# Patient Record
Sex: FEMALE | Employment: UNEMPLOYED | URBAN - METROPOLITAN AREA
[De-identification: names, ages, dates, MRNs, and addresses within clinical notes are randomized per-mention and may not be internally consistent; named-entity substitution may affect disease eponyms.]

---

## 2017-01-09 ENCOUNTER — MEDICAL CORRESPONDENCE (OUTPATIENT)
Dept: HEALTH INFORMATION MANAGEMENT | Facility: CLINIC | Age: 47
End: 2017-01-09

## 2017-01-09 ENCOUNTER — TRANSFERRED RECORDS (OUTPATIENT)
Dept: HEALTH INFORMATION MANAGEMENT | Facility: CLINIC | Age: 47
End: 2017-01-09

## 2017-01-12 ENCOUNTER — TRANSFERRED RECORDS (OUTPATIENT)
Dept: HEALTH INFORMATION MANAGEMENT | Facility: CLINIC | Age: 47
End: 2017-01-12

## 2017-01-12 DIAGNOSIS — H53.10 SUBJECTIVE VISUAL DISTURBANCE: Primary | ICD-10-CM

## 2017-01-13 ENCOUNTER — OFFICE VISIT (OUTPATIENT)
Dept: OPHTHALMOLOGY | Facility: CLINIC | Age: 47
End: 2017-01-13
Attending: OPHTHALMOLOGY
Payer: COMMERCIAL

## 2017-01-13 DIAGNOSIS — H50.22 HYPERTROPIA OF LEFT EYE: ICD-10-CM

## 2017-01-13 DIAGNOSIS — H35.373 EPIRETINAL MEMBRANE, BILATERAL: ICD-10-CM

## 2017-01-13 DIAGNOSIS — H53.10 SUBJECTIVE VISUAL DISTURBANCE: Primary | ICD-10-CM

## 2017-01-13 DIAGNOSIS — H57.11 PAIN IN OR AROUND EYE, RIGHT: ICD-10-CM

## 2017-01-13 DIAGNOSIS — H53.10 SUBJECTIVE VISUAL DISTURBANCE: ICD-10-CM

## 2017-01-13 LAB — TSH SERPL DL<=0.05 MIU/L-ACNC: 0.2 MU/L (ref 0.4–4)

## 2017-01-13 PROCEDURE — 99215 OFFICE O/P EST HI 40 MIN: CPT | Mod: ZF

## 2017-01-13 PROCEDURE — 92083 EXTENDED VISUAL FIELD XM: CPT | Mod: ZF | Performed by: OPHTHALMOLOGY

## 2017-01-13 PROCEDURE — 92060 SENSORIMOTOR EXAMINATION: CPT | Mod: ZF | Performed by: OPHTHALMOLOGY

## 2017-01-13 RX ORDER — ACETAMINOPHEN 160 MG
4000 TABLET,DISINTEGRATING ORAL
COMMUNITY
Start: 2016-11-29 | End: 2018-09-17

## 2017-01-13 RX ORDER — ONDANSETRON 8 MG/1
TABLET, ORALLY DISINTEGRATING ORAL
COMMUNITY
Start: 2016-12-08

## 2017-01-13 RX ORDER — PREDNISONE 1 MG/1
1 TABLET ORAL
COMMUNITY
Start: 2016-12-27 | End: 2018-09-17

## 2017-01-13 RX ORDER — PREDNISONE 5 MG/1
5 TABLET ORAL
COMMUNITY
Start: 2016-12-27 | End: 2018-09-17

## 2017-01-13 RX ORDER — LANOLIN ALCOHOL/MO/W.PET/CERES
800 CREAM (GRAM) TOPICAL
COMMUNITY
Start: 2016-12-27 | End: 2018-09-17

## 2017-01-13 RX ORDER — POLYETHYLENE GLYCOL 3350 17 G/17G
17 POWDER, FOR SOLUTION ORAL
COMMUNITY
Start: 2016-12-27

## 2017-01-13 RX ORDER — PREDNISOLONE ACETATE 10 MG/ML
1 SUSPENSION/ DROPS OPHTHALMIC 4 TIMES DAILY
Qty: 1 BOTTLE | Refills: 0 | Status: SHIPPED | OUTPATIENT
Start: 2017-01-13 | End: 2017-01-25

## 2017-01-13 RX ORDER — PREDNISONE 20 MG/1
40 TABLET ORAL
COMMUNITY
Start: 2016-12-27 | End: 2018-09-17

## 2017-01-13 ASSESSMENT — REFRACTION_WEARINGRX
OD_CYLINDER: +0.25
OD_AXIS: 171
OD_SPHERE: -1.25
OS_SPHERE: -1.00

## 2017-01-13 ASSESSMENT — VISUAL ACUITY
CORRECTION_TYPE: GLASSES
OD_CC+: +2
OS_CC+: +2
OD_CC: 20/25
METHOD: SNELLEN - LINEAR
OS_CC: 20/25

## 2017-01-13 ASSESSMENT — SLIT LAMP EXAM - LIDS
COMMENTS: NORMAL
COMMENTS: NORMAL

## 2017-01-13 ASSESSMENT — TONOMETRY
OS_IOP_MMHG: 13
IOP_METHOD: TONOPEN
OD_IOP_MMHG: 18

## 2017-01-13 ASSESSMENT — CONF VISUAL FIELD
OD_NORMAL: 1
OS_NORMAL: 1

## 2017-01-13 NOTE — NURSING NOTE
Chief Complaints and History of Present Illnesses   Patient presents with     Neurologic Problem     distorted vision     HPI    Affected eye(s):  Both   Symptoms:     Blurred vision   Distorted vision      Frequency:  Intermittent       Do you have eye pain now?:  Yes   Location:  OU, OD   Pain Level:  Extreme Pain (8)   Pain Frequency:  Daily, Intermittent   Pain Characteristics:  Stabbing      Comments:  Amelia is a 47 year old female presenting with the following symptoms:    1. Distorted vision       - typically distorsion presents as blurred vision, but last week, floor looks to have a permanent slant to it.        - intermittent diplopia. Onset 04/2016.        - 57 mg prednisone daily for swelling. Trouble when tapering.       -       2. Eye pain       - worse on EOM. Right pain > Left       - Samantha Day: describes pain, swelling and chemosis of right eye       - thyroid testing was negative.     3. Photosensitivity      - wears dark glasses at night to drive    4. Headaches/head pain.  - associated with gluten sensitivity  - in the back and top of head.   - relieved by Tizanidine  - wake her up at night.     5. Tinnitus  - high pitch ringing both ears.   - feels ears are always clogged.     Jack Robie CO 7:31 AM January 13, 2017       5. Eye swelling    History of Left optic nerve swelling in 2013 (MRI at [facility])  10/2015: pain in right eye and use of prednisone. Prednisone helped with the pain. But pain returned when discontinued

## 2017-01-13 NOTE — Clinical Note
2017         RE:  :  MRN: Amelia Lin  1970  [id number]     Dear Dr. Tang,    Thank you for asking me to see your very pleasant patient, Amelia Lin, in neuro-ophthalmic consultation.  I would like to thank you for sending your records and I have summarized them in the history of present illness.  My assessment and plan are below.  For further details, please see my attached clinic note.        Assessment & Plan     Amelia Lin is a 47 year old female with the following diagnoses:   1. Subjective visual disturbance    2. Hypertropia of left eye    3. Epiretinal membrane, bilateral    4. Pain in or around eye, right       She has bilateral  Eye pain for several months. This improved with steroid antibiotic eye drops and also prednisone 60 mg daily.  She was treated for idiopathic orbital inflammatory syndrome however her eye pain returns when she drops to 57 mg prednisone.  If she reduces prednisone, then her eyelid swells and her eye hurts.  In fact, it is hurting now. A drop of proparacaine helps the pain significantly.  Her labs show a very low thryoid stimulating hormone.     I looked at her MRI personally.  There is increased hyperintensity on the scan before and after prednisone treatment.  It is worse in the axial plane and not really there in the coronal plane.  She has metal artifact in her teeth.  Her lacrimal glands look funny to me and I will confirm with oculoplastics.      I will obtain a CT orbit.  This will allow me to assess any bony erosion from the lacrimal glands and also see if there is truly dirty fat in the orbits.  I would favor that this is not idiopathic orbital inflammatory syndrome.      It sounds more like a steroid responsive anterior surface given the response to topical steroids and proparacaine.  Will taper prednisone more aggressively and put her on prednisolone acetate 1% ophthalmic suspension.  Will repeat thyroid stimulating immunoglobulin and thryoid  stimulating hormone.      She is concerned that her body swells up when she comes down on prednisone.  If this is the case then I will ask Dr. Andino for a steroid sparing agent.      Follow up 1 month.          Again, thank you for allowing me to participate in the care of your patient.      Sincerely,    Sung Read MD  Professor, Neuro-Ophthalmology  Department of Ophthalmology and Visual Neurosciences  [facility]    CC: Favian Tang MD  [facility]  [address]  [facility] [address]  VIA Facsimile: [phone]     Williams Ulloa MD   Physicians  [address]  [facility] [address]  VIA In Basket     Kaitlynn Gaming MD  [facility]  [address]  [facility] [address]  VIA Facsimile:  [phone]     Benoit Mendez MD  [facility]  [address]  [facility] [address]  VIA Facsimile: [phone]

## 2017-01-13 NOTE — PROGRESS NOTES
Assessment & Plan     Amelia Lin is a 47 year old female with the following diagnoses:   1. Subjective visual disturbance    2. Hypertropia of left eye    3. Epiretinal membrane, bilateral    4. Pain in or around eye, right       She has bilateral  Eye pain for several months. This improved with steroid antibiotic eye drops and also prednisone 60 mg daily.  She was treated for idiopathic orbital inflammatory syndrome however her eye pain returns when she drops to 57 mg prednisone.  If she reduces prednisone, then her eyelid swells and her eye hurts.  In fact, it is hurting now. A drop of proparacaine helps the pain significantly.  Her labs show a very low thryoid stimulating hormone.     I looked at her MRI personally.  There is increased hyperintensity on the scan before and after prednisone treatment.  It is worse in the axial plane and not really there in the coronal plane.  She has metal artifact in her teeth.  Her lacrimal glands look funny to me and I will confirm with oculoplastics.      I will obtain a CT orbit.  This will allow me to assess any bony erosion from the lacrimal glands and also see if there is truly dirty fat in the orbits.  I would favor that this is not idiopathic orbital inflammatory syndrome.      It sounds more like a steroid responsive anterior surface given the response to topical steroids and proparacaine.  Will taper prednisone more aggressively and put her on prednisolone acetate 1% ophthalmic suspension.  Will repeat thyroid stimulating immunoglobulin and thryoid stimulating hormone.      She is concerned that her body swells up when she comes down on prednisone.  If this is the case then I will ask Dr. Andino for a steroid sparing agent.      Follow up 1 month.             Attending Physician Attestation:  I have seen and examined this patient.  I have confirmed and edited as necessary the chief complaint(s), history of present illness, review of systems, relevant history,  and examination findings as documented by others.  I have personally reviewed the relevant tests, images, and reports as documented above.  I have confirmed and edited as necessary the assessment and plan and agree with this note.  - Sung Read MD 9:10 AM 1/13/2017

## 2017-01-13 NOTE — MR AVS SNAPSHOT
After Visit Summary   1/13/2017    Amelia Lin    MRN: [id number]           Patient Information     Date Of Birth          1970        Visit Information        Provider Department      1/13/2017 7:30 AM Sung Read MD [facility]        Today's Diagnoses     Subjective visual disturbance    -  1     Hypertropia of left eye         Epiretinal membrane, bilateral         Pain in or around eye, right            Follow-ups after your visit        Follow-up notes from your care team     Return in 4 weeks (on 2/10/2017).      Your next 10 appointments already scheduled     Jan 13, 2017  1:40 PM   (Arrive by 1:25 PM)   CT TEMPORAL ORBITAL SELLA W/O & W CONTRAST with UCCT1   [facility] ([facility])    [address]  1st Floor  [place] [address] [phone]           Please bring any scans or X-rays taken at other hospitals, if similar tests were done. Also bring a list of your medicines, including vitamins, minerals and over-the-counter drugs. It is safest to leave personal items at home.  Be sure to tell your doctor:   If you have any allergies.   If there s any chance you are pregnant.   If you are breastfeeding.   If you have any special needs.  You will have contrast for this exam. To prepare:   Do not eat or drink for 2 hours before your exam. If you need to take medicine, you may take it with small sips of water. (We may ask you to take liquid medicine as well.)   The day before your exam, drink extra fluids at least six 8-ounce glasses (unless your doctor tells you to restrict your fluids).  Patients over 70 or patients with diabetes or kidney problems:   If you haven t had a blood test (creatinine test) within the last 30 days, go to your clinic or Diagnostic Imaging Department for this test.  If you have diabetes:   If your kidney function is normal, continue taking your metformin (Avandamet, Glucophage, Glucovance, Metaglip)  on the day of your exam.   If your kidney function is abnormal, wait 48 hours before restarting this medicine.  Please wear loose clothing, such as a sweat suit or jogging clothes. Avoid snaps, zippers and other metal. We may ask you to undress and put on a hospital gown.  If you have any questions, please call the Imaging Department where you will have your exam.              Future tests that were ordered for you today     Open Future Orders        Priority Expected Expires Ordered    CT Temporal Orbital Sella w/o & w Cont Routine  1/13/2018 1/13/2017    Thyroid stimulating immunoglobulin Routine  4/13/2017 1/13/2017    TSH Routine  4/13/2017 1/13/2017    IOP Measurement Routine  7/17/2018 1/13/2017    Color Vision - Screening OU (both eyes) Routine  7/17/2018 1/13/2017    OCT Optic Nerve RNFL Spectralis OU (both eyes) Routine  7/17/2018 1/13/2017            Who to contact     Please call your clinic at [phone] to:    Ask questions about your health    Make or cancel appointments    Discuss your medicines    Learn about your test results    Speak to your doctor   If you have compliments or concerns about an experience at your clinic, or if you wish to file a complaint, please contact [facility] Patient Relations at [phone] or email us at [email or web address]         Additional Information About Your Visit        MyChart Information     [employer] gives you secure access to your electronic health record. If you see a primary care provider, you can also send messages to your care team and make appointments. If you have questions, please call your primary care clinic.  If you do not have a primary care provider, please call [phone] and they will assist you.      [employer] is an electronic gateway that provides easy, online access to your medical records. With [employer], you can request a clinic appointment, read your test results, renew a prescription or communicate with  your care team.     To access your existing account, please contact your [facility] or call [phone] for assistance.        Care EveryWhere ID     This is your Care EveryWhere ID. This could be used by other organizations to access your [facility] medical records  [id number]         Blood Pressure from Last 3 Encounters:   No data found for BP    Weight from Last 3 Encounters:   05/26/15 119.75 kg (264 lb)              We Performed the Following     Color Vision - Screening OU (both eyes)     Glaucoma Top OU     IOP Measurement     Sensorimotor          Today's Medication Changes          These changes are accurate as of: 1/13/17  9:34 AM.  If you have any questions, ask your nurse or doctor.               Start taking these medicines.        Dose/Directions    prednisoLONE acetate 1 % ophthalmic susp   Commonly known as:  PRED FORTE   Used for:  Subjective visual disturbance, Hypertropia of left eye, Epiretinal membrane, bilateral, Pain in or around eye, right   Started by:  Sung Read MD        Dose:  1 drop   Place 1 drop into both eyes 4 times daily   Quantity:  1 Bottle   Refills:  0            Where to get your medicines      These medications [facility] sent to [id number] - [address] AT CORNER OF [address] [place] [address]     Phone:  [phone]    - prednisoLONE acetate 1 % ophthalmic susp             Primary Care Provider Office Phone # Fax #    Favian Tang [phone]       [facility] [address] [address]        Thank you!     Thank you for choosing [facility]  for your care. Our goal is always to provide you with excellent care. Hearing back from our patients is one way we can continue to improve our services. Please take a few minutes to complete the written survey that you may receive in the mail after your visit with us. Thank you!              Your Updated Medication List - Protect others around you: Learn how to safely use, store and throw away your medicines at www.disposemymeds.org.          This list is accurate as of: 1/13/17  9:34 AM.  Always use your most recent med list.                   Brand Name Dispense Instructions for use    ARTIFICIAL TEARS Oint      1 Application       Fish Oil 1200 MG Cpdr      Take 1,200 mg by mouth       GENTEAL MILD TO MODERATE 0.3 % Soln ophthalmic solution   Generic drug:  hypromellose          HYDROCHLOROTHIAZIDE PO      Take 25 mg by mouth daily       magnesium oxide 400 (240 MG) MG tablet    MAG-OX     Take 800 mg by mouth       NAPROXEN SODIUM PO      Take 550 mg by mouth 2 times daily (with meals)       OMEPRAZOLE PO      Take 20 mg by mouth 2 times daily (before meals)       ondansetron 8 MG ODT tab    ZOFRAN-ODT         oxyCODONE-acetaminophen 5-325 MG per tablet    PERCOCET     Take 1-2 tablets by mouth every 4 hours as needed for moderate to severe pain       polyethylene glycol powder    MIRALAX/GLYCOLAX     Take 17 g by mouth       prednisoLONE acetate 1 % ophthalmic susp    PRED FORTE    1 Bottle    Place 1 drop into both eyes 4 times daily       * predniSONE 1 MG tablet    DELTASONE     Take 1 mg by mouth       * predniSONE 20 MG tablet    DELTASONE     Take 40 mg by mouth       * predniSONE 5 MG tablet    DELTASONE     Take 5 mg by mouth       TIZANIDINE HCL PO      Take 4 mg by mouth 2 times daily as needed for muscle spasms       vitamin D3 2000 UNITS Caps      Take 4,000 Units by mouth       * Notice:  This list has 3 medication(s) that are the same as other medications prescribed for you. Read the directions carefully, and ask your doctor or other care provider to review them with you.

## 2017-01-16 DIAGNOSIS — R79.89 LOW TSH LEVEL: Primary | ICD-10-CM

## 2017-01-17 ENCOUNTER — TELEPHONE (OUTPATIENT)
Dept: OPHTHALMOLOGY | Facility: CLINIC | Age: 47
End: 2017-01-17

## 2017-01-17 LAB — TSI SER-ACNC: NORMAL

## 2017-01-17 NOTE — TELEPHONE ENCOUNTER
Pt last seen by dr. Read 1-13-17.  Has had CT done since visit.  Pt now on 50 mg oral prednisone and having worsening symptoms  Prednisolone drops help with symptoms, but having swelling of uppder/lower lids  Sharp headache pain  Tearing   White part of eyes puffy  Pressure behind eyes    States happens when prednisone reduced  Offered appt today-- pt unable needs day for transportation and has to work-- new to this job and needs to be there.  Pt taking picture to send to MD to eval    Note to facilitator/dr. Read for review and f/u plan of care  Fabian Duvall RN 11:41 AM 01/17/2017

## 2017-01-17 NOTE — TELEPHONE ENCOUNTER
----- Message from Gustavo Carlson sent at 1/17/2017 11:02 AM CST -----  Regarding: SYMPTOMATIC Pt - 4+ days  Contact: [phone]  Amelia Baird is still having eye pain from her recent visit with Dr. Read and would like a call back to discuss treatment.      Thanks,  Gustavo    Please DO NOT send this message and/or reply back to sender.  Call Center Representatives DO NOT respond to messages.

## 2017-01-18 ENCOUNTER — TELEPHONE (OUTPATIENT)
Dept: OPHTHALMOLOGY | Facility: CLINIC | Age: 47
End: 2017-01-18

## 2017-01-18 DIAGNOSIS — R79.89 LOW TSH LEVEL: Primary | ICD-10-CM

## 2017-01-19 ENCOUNTER — OFFICE VISIT (OUTPATIENT)
Dept: OPHTHALMOLOGY | Facility: CLINIC | Age: 47
End: 2017-01-19
Attending: OPHTHALMOLOGY
Payer: COMMERCIAL

## 2017-01-19 DIAGNOSIS — R51.9 HEADACHE AROUND THE EYES: Primary | ICD-10-CM

## 2017-01-19 DIAGNOSIS — H53.10 SUBJECTIVE VISUAL DISTURBANCE: Primary | ICD-10-CM

## 2017-01-19 PROCEDURE — 99211 OFF/OP EST MAY X REQ PHY/QHP: CPT | Mod: ZF

## 2017-01-19 PROCEDURE — 99212 OFFICE O/P EST SF 10 MIN: CPT

## 2017-01-19 ASSESSMENT — VISUAL ACUITY
OS_CC: 20/30
OS_CC+: +1
OD_CC: 20/30
METHOD: SNELLEN - LINEAR
OD_CC+: +1

## 2017-01-19 ASSESSMENT — TONOMETRY
IOP_METHOD: TONOPEN
OD_IOP_MMHG: 18
IOP_METHOD: TONOPEN
OS_IOP_MMHG: 23
OS_IOP_MMHG: 23
OD_IOP_MMHG: 28

## 2017-01-19 ASSESSMENT — EXTERNAL EXAM - LEFT EYE: OS_EXAM: NORMAL

## 2017-01-19 ASSESSMENT — EXTERNAL EXAM - RIGHT EYE: OD_EXAM: NORMAL

## 2017-01-19 ASSESSMENT — SLIT LAMP EXAM - LIDS
COMMENTS: NORMAL
COMMENTS: NORMAL

## 2017-01-19 NOTE — PROGRESS NOTES
Assessment & Plan     Amelia Lin is a 47 year old female with the following diagnoses:   1. Headache around the eyes         Over the past two days she had recurrence of severe headache around her eyes with more light sensitivity and pain around her eye. Examination today is stable with no changes since the last visit.      Would continue prednisone taper as previously described.  Will get consult for headache specialist.  Her thryoid stimulating hormone is very low and will have her see endocrine.           Attending Physician Attestation:  I have seen and examined this patient.  I have confirmed and edited as necessary the chief complaint(s), history of present illness, review of systems, relevant history, and examination findings as documented by others.  I have personally reviewed the relevant tests, images, and reports as documented above.  I have confirmed and edited as necessary the assessment and plan and agree with this note.  - Sung Read MD 3:36 PM 1/19/2017

## 2017-01-19 NOTE — TELEPHONE ENCOUNTER
"Patient called stating bilateral eye pain worse since decreasing oral prednisone. Wonders if it is an infection and if she shouldn't be treated for infection. States eyes are \"dripping a lot, like Won Ding in that movie\". She is having trouble performing her work duties due to discomfort. Pain is becoming more constant. Discussed increased use of artificial tears, cool compresses. Patient wanting to know if okay to go back up to 60 mg of prednisone as she does much better at that dose. I instructed her to take an additional 10 mg on top of the 50 mg she already took today to see if that helps. Patient frustrated that she has been calling in the past 2 days and has not received a call back from Dr. Read. I affirmed that I will send a message to Dr. Read stating that the patient would really appreciate a call back.     Sven Fabian MD MPH   Ophthalmology Resident PGY-3      "

## 2017-01-19 NOTE — Clinical Note
2017         RE:  :  MRN: Amelia Lin  1970  [id number]     Dear Dr. Tang,     Your patient, Amelia Lin, returned for neuro-ophthalmic follow up. My assessment and plan are below.  For further details, please see my attached clinic note.      Assessment & Plan     Amelia Lin is a 47 year old female with the following diagnoses:   1. Headache around the eyes         Over the past two days she had recurrence of severe headache around her eyes with more light sensitivity and pain around her eye. Examination today is stable with no changes since the last visit.      Would continue prednisone taper as previously described.  Will get consult for headache specialist.  Her thryoid stimulating hormone is very low and will have her see endocrine.        Again, thank you for allowing me to participate in the care of your patient.      Sincerely,    Sung Read MD  Professor, Neuro-Ophthalmology  Department of Ophthalmology and Visual Neurosciences  [facility]    CC: Favian Tang MD  [facility]  [address] [address]  VIA Facsimile: [phone]     Williams Ulloa MD  VIA In Basket     Jing Valentino MD  VIA In Basket

## 2017-01-25 DIAGNOSIS — H50.22 HYPERTROPIA OF LEFT EYE: ICD-10-CM

## 2017-01-25 DIAGNOSIS — H35.373 EPIRETINAL MEMBRANE, BILATERAL: ICD-10-CM

## 2017-01-25 DIAGNOSIS — H57.11 PAIN IN OR AROUND EYE, RIGHT: ICD-10-CM

## 2017-01-25 DIAGNOSIS — H53.10 SUBJECTIVE VISUAL DISTURBANCE: Primary | ICD-10-CM

## 2017-01-25 NOTE — TELEPHONE ENCOUNTER
prednisolone  Last Written Prescription Date:  1/13/17  Last Fill Quantity: 1 btl,   # refills: 0  Last Office Visit: 1/13/17 and 1/19/17  Future Office visit: no  Last Note:  Author Note Status Last Update User Last Update Date/Time      Sung Read MD Signed Sung Read MD 1/13/2017  2:16 PM       Progress Notes      Expand All Collapse All    Assessment & Plan      Assessment & Plan     Amelia Lin is a 47 year old female with the following diagnoses:    1.  Subjective visual disturbance     2.  Hypertropia of left eye     3.  Epiretinal membrane, bilateral     4.  Pain in or around eye, right        She has bilateral  Eye pain for several months. This improved with steroid antibiotic eye drops and also prednisone 60 mg daily.  She was treated for idiopathic orbital inflammatory syndrome however her eye pain returns when she drops to 57 mg prednisone.  If she reduces prednisone, then her eyelid swells and her eye hurts.  In fact, it is hurting now. A drop of proparacaine helps the pain significantly.  Her labs show a very low thryoid stimulating hormone.     I looked at her MRI personally.  There is increased hyperintensity on the scan before and after prednisone treatment.  It is worse in the axial plane and not really there in the coronal plane.  She has metal artifact in her teeth.  Her lacrimal glands look funny to me and I will confirm with oculoplastics.      I will obtain a CT orbit.  This will allow me to assess any bony erosion from the lacrimal glands and also see if there is truly dirty fat in the orbits.  I would favor that this is not idiopathic orbital inflammatory syndrome.      It sounds more like a steroid responsive anterior surface given the response to topical steroids and proparacaine.  Will taper prednisone more aggressively and put her on prednisolone acetate 1% ophthalmic suspension.  Will repeat thyroid stimulating immunoglobulin and thryoid stimulating hormone.       She is concerned that her body swells up when she comes down on prednisone.  If this is the case then I will ask Dr. Andino for a steroid sparing agent.      Follow up 1 month.              Routing refill request to provider for review/approval because:  Unsure if this is to continue, no follow up appt

## 2017-01-26 ENCOUNTER — TRANSFERRED RECORDS (OUTPATIENT)
Dept: HEALTH INFORMATION MANAGEMENT | Facility: CLINIC | Age: 47
End: 2017-01-26

## 2017-01-26 ENCOUNTER — TELEPHONE (OUTPATIENT)
Dept: OPHTHALMOLOGY | Facility: CLINIC | Age: 47
End: 2017-01-26

## 2017-01-26 RX ORDER — PREDNISOLONE ACETATE 10 MG/ML
1 SUSPENSION/ DROPS OPHTHALMIC 4 TIMES DAILY
Qty: 1 BOTTLE | Refills: 0 | Status: SHIPPED | OUTPATIENT
Start: 2017-01-26 | End: 2018-09-17

## 2017-01-26 NOTE — TELEPHONE ENCOUNTER
"Right eye horrible continuous pain last night, hurts to squint, hurts to move it, everything makes it hurt. Considerably worse than it used to be. This morning it started draining;   Photophobia, causing headaches and eye strain OD>OS, hard to work because of bright light and computers at work. Has to wear sunglasses over glasses.   Only way to relieve pain is to put herself in coma with \"Tizanidine\" but she can't function when she takes, goes right to sleep.   No other pain medication will work. The eye drops help with swelling but nothing will help with pain. She was going to go to ER because of the pain.   Constant double vision, right eye has a grey spot in vision, floor warped (OU).      Also, pt needs refill on eye drop; Pharmacy has not received yet.   [id number] [place], [place] - [address]    Pt. Wondering, will she be taking this eye drop indefinitely? It doesn't help with pain.   "

## 2017-01-27 ENCOUNTER — TELEPHONE (OUTPATIENT)
Dept: OPHTHALMOLOGY | Facility: CLINIC | Age: 47
End: 2017-01-27

## 2017-01-27 NOTE — TELEPHONE ENCOUNTER
----- Message from Sung Read MD sent at 1/26/2017  7:45 PM CST -----  Regarding: RE: Note from Dr. Read  Contact: [phone]  Could you please tell patient that it is unclear to me why the patient has pain?  I would opt for another opinion.   We can send her to [place] if she would like. Thanks.    mirta    ----- Message -----     From: Whit Wills     Sent: 1/26/2017  11:48 AM       To: Sung Read MD, CLINTON Salazar  Subject: RE: Note from Dr. Read                            I wrote a note, but when I spoke with her she said it wouldn't help anyway so never mind.    ----- Message -----     From: Wes Simmons COA     Sent: 1/23/2017  10:11 AM       To: Sung Read MD, Whit Wills  Subject: FW: Note from Dr. Read                            Estefani,     Will you write this please and mail to pt, if Dr. Read approves?  She will also being seeing an endocrinologist and headache specialist, if that matters.    Thanks    ----- Message -----     From: Ella Ramsey     Sent: 1/23/2017  10:04 AM       To: CLINTON Salazar  Subject: Note from Dr. Read                                Pt called to request that Dr. Read provide a note for her job. She stated that due to her worsening vision and sensitivity to light at work, that a note is required for her employer to make any adjustments as far as a new computer monitor goes. Please call pt back to discuss further. Thanks.    KP    Please DO NOT send this message and/or reply back to sender.  Call Center Representatives DO NOT respond to messages.

## 2017-01-30 DIAGNOSIS — H05.10 ORBITAL INFLAMMATION: Primary | ICD-10-CM

## 2017-01-30 RX ORDER — PREDNISONE 20 MG/1
40 TABLET ORAL DAILY
Qty: 60 TABLET | Refills: 3 | Status: SHIPPED | OUTPATIENT
Start: 2017-01-30 | End: 2018-09-17

## 2017-02-03 ENCOUNTER — MEDICAL CORRESPONDENCE (OUTPATIENT)
Dept: HEALTH INFORMATION MANAGEMENT | Facility: CLINIC | Age: 47
End: 2017-02-03

## 2017-02-28 ENCOUNTER — TRANSFERRED RECORDS (OUTPATIENT)
Dept: HEALTH INFORMATION MANAGEMENT | Facility: CLINIC | Age: 47
End: 2017-02-28

## 2017-06-10 ENCOUNTER — HEALTH MAINTENANCE LETTER (OUTPATIENT)
Age: 47
End: 2017-06-10

## 2018-03-19 ENCOUNTER — TRANSFERRED RECORDS (OUTPATIENT)
Dept: HEALTH INFORMATION MANAGEMENT | Facility: CLINIC | Age: 48
End: 2018-03-19

## 2018-09-17 ENCOUNTER — HOSPITAL ENCOUNTER (OUTPATIENT)
Dept: LAB | Facility: CLINIC | Age: 48
Discharge: HOME | End: 2018-09-17
Attending: PHYSICIAN ASSISTANT | Admitting: PHYSICIAN ASSISTANT
Payer: COMMERCIAL

## 2018-09-17 ENCOUNTER — OFFICE VISIT (OUTPATIENT)
Dept: SURGERY | Facility: CLINIC | Age: 48
End: 2018-09-17
Payer: COMMERCIAL

## 2018-09-17 VITALS
OXYGEN SATURATION: 99 % | HEART RATE: 79 BPM | DIASTOLIC BLOOD PRESSURE: 79 MMHG | WEIGHT: 293 LBS | SYSTOLIC BLOOD PRESSURE: 123 MMHG | RESPIRATION RATE: 12 BRPM | BODY MASS INDEX: 47.09 KG/M2 | HEIGHT: 66 IN

## 2018-09-17 DIAGNOSIS — Z13.0 SCREENING FOR IRON DEFICIENCY ANEMIA: ICD-10-CM

## 2018-09-17 DIAGNOSIS — R11.14 BILIOUS VOMITING WITH NAUSEA: ICD-10-CM

## 2018-09-17 DIAGNOSIS — R73.03 PREDIABETES: ICD-10-CM

## 2018-09-17 DIAGNOSIS — I10 ESSENTIAL HYPERTENSION: ICD-10-CM

## 2018-09-17 DIAGNOSIS — K44.9 HIATAL HERNIA: ICD-10-CM

## 2018-09-17 DIAGNOSIS — Z13.228 SCREENING FOR ENDOCRINE, NUTRITIONAL, METABOLIC AND IMMUNITY DISORDER: ICD-10-CM

## 2018-09-17 DIAGNOSIS — K58.1 IRRITABLE BOWEL SYNDROME WITH CONSTIPATION: ICD-10-CM

## 2018-09-17 DIAGNOSIS — R60.0 BILATERAL LEG EDEMA: ICD-10-CM

## 2018-09-17 DIAGNOSIS — Z13.21 SCREENING FOR ENDOCRINE, NUTRITIONAL, METABOLIC AND IMMUNITY DISORDER: ICD-10-CM

## 2018-09-17 DIAGNOSIS — E66.01 MORBID OBESITY (H): ICD-10-CM

## 2018-09-17 DIAGNOSIS — Z13.29 SCREENING FOR ENDOCRINE, NUTRITIONAL, METABOLIC AND IMMUNITY DISORDER: ICD-10-CM

## 2018-09-17 DIAGNOSIS — Z13.0 SCREENING FOR ENDOCRINE, NUTRITIONAL, METABOLIC AND IMMUNITY DISORDER: ICD-10-CM

## 2018-09-17 DIAGNOSIS — R00.0 SINUS TACHYCARDIA: ICD-10-CM

## 2018-09-17 DIAGNOSIS — H53.143 PHOTOPHOBIA OF BOTH EYES: ICD-10-CM

## 2018-09-17 DIAGNOSIS — E66.01 MORBID OBESITY (H): Primary | ICD-10-CM

## 2018-09-17 DIAGNOSIS — K21.9 GASTROESOPHAGEAL REFLUX DISEASE WITHOUT ESOPHAGITIS: ICD-10-CM

## 2018-09-17 LAB
ALBUMIN SERPL-MCNC: 3.3 G/DL (ref 3.4–5)
ALP SERPL-CCNC: 97 U/L (ref 40–150)
ALT SERPL W P-5'-P-CCNC: 32 U/L (ref 0–50)
ANION GAP SERPL CALCULATED.3IONS-SCNC: 7 MMOL/L (ref 3–14)
AST SERPL W P-5'-P-CCNC: 17 U/L (ref 0–45)
BILIRUB SERPL-MCNC: 0.1 MG/DL (ref 0.2–1.3)
BUN SERPL-MCNC: 14 MG/DL (ref 7–30)
CALCIUM SERPL-MCNC: 8.7 MG/DL (ref 8.5–10.1)
CHLORIDE SERPL-SCNC: 106 MMOL/L (ref 94–109)
CO2 SERPL-SCNC: 31 MMOL/L (ref 20–32)
CREAT SERPL-MCNC: 0.73 MG/DL (ref 0.52–1.04)
ERYTHROCYTE [DISTWIDTH] IN BLOOD BY AUTOMATED COUNT: 14.8 % (ref 10–15)
GFR SERPL CREATININE-BSD FRML MDRD: 85 ML/MIN/1.7M2
GLUCOSE SERPL-MCNC: 89 MG/DL (ref 70–99)
HBA1C MFR BLD: 5.6 % (ref 0–5.6)
HCT VFR BLD AUTO: 35.5 % (ref 35–47)
HGB BLD-MCNC: 11.2 G/DL (ref 11.7–15.7)
MCH RBC QN AUTO: 27.6 PG (ref 26.5–33)
MCHC RBC AUTO-ENTMCNC: 31.5 G/DL (ref 31.5–36.5)
MCV RBC AUTO: 87 FL (ref 78–100)
PLATELET # BLD AUTO: 337 10E9/L (ref 150–450)
POTASSIUM SERPL-SCNC: 3.7 MMOL/L (ref 3.4–5.3)
PROT SERPL-MCNC: 7.4 G/DL (ref 6.8–8.8)
RBC # BLD AUTO: 4.06 10E12/L (ref 3.8–5.2)
SODIUM SERPL-SCNC: 144 MMOL/L (ref 133–144)
WBC # BLD AUTO: 7.8 10E9/L (ref 4–11)

## 2018-09-17 PROCEDURE — 85027 COMPLETE CBC AUTOMATED: CPT | Performed by: PHYSICIAN ASSISTANT

## 2018-09-17 PROCEDURE — 83036 HEMOGLOBIN GLYCOSYLATED A1C: CPT | Performed by: PHYSICIAN ASSISTANT

## 2018-09-17 PROCEDURE — 80053 COMPREHEN METABOLIC PANEL: CPT | Performed by: PHYSICIAN ASSISTANT

## 2018-09-17 PROCEDURE — 36415 COLL VENOUS BLD VENIPUNCTURE: CPT | Performed by: PHYSICIAN ASSISTANT

## 2018-09-17 PROCEDURE — 97802 MEDICAL NUTRITION INDIV IN: CPT | Performed by: DIETITIAN, REGISTERED

## 2018-09-17 PROCEDURE — 82306 VITAMIN D 25 HYDROXY: CPT | Performed by: PHYSICIAN ASSISTANT

## 2018-09-17 PROCEDURE — 99205 OFFICE O/P NEW HI 60 MIN: CPT | Performed by: PHYSICIAN ASSISTANT

## 2018-09-17 RX ORDER — HYDROCORTISONE 10 MG/1
TABLET ORAL
Refills: 3 | COMMUNITY
Start: 2018-08-19

## 2018-09-17 RX ORDER — METOPROLOL TARTRATE 50 MG
TABLET ORAL
Refills: 3 | COMMUNITY
Start: 2018-08-21

## 2018-09-17 RX ORDER — MENTHOL 5.8 MG/1
2 LOZENGE ORAL DAILY
Qty: 180 TABLET | Refills: 3 | Status: SHIPPED | OUTPATIENT
Start: 2018-09-17

## 2018-09-17 RX ORDER — OMEPRAZOLE 40 MG/1
40 CAPSULE, DELAYED RELEASE ORAL 2 TIMES DAILY
Qty: 60 CAPSULE | Refills: 2 | Status: SHIPPED | OUTPATIENT
Start: 2018-09-17

## 2018-09-17 NOTE — PROGRESS NOTES
"New Bariatric Nutrition Consultation Note    Reason For Visit: Nutrition Assessment    Amelia Lin is a 48 year old presenting today for new bariatric nutrition consult.  Pt is interested in laparoscopic sleeve gastrectomy or gastric bypass.  Patient is accompanied by self.    Support System Reviewed With Patient 9/12/2018   Who do you have in your support network that can be available to help you for the first 2 weeks after surgery? mom   Who can you count on for support throughout your weight loss surgery journey?         ANTHROPOMETRICS:  Estimated body mass index is 52.13 kg/(m^2) as calculated from the following:    Height as of an earlier encounter on 9/17/18: 1.676 m (5' 6\").    Weight as of an earlier encounter on 9/17/18: 146.5 kg (323 lb).    Required weight loss goal pre-op: 10 lbs from initial consult weight (goal weight 313 lbs or less before surgery)       9/12/2018   I have tried the following methods to lose weight Watching portions or calories, Exercise, Weight Watchers, Pre packaged meals ex: Nutrisystem, OTC Medications       Weight Loss Questions Reviewed With Patient 9/12/2018   How long have you been overweight? Since early childhood       SUPPLEMENT INFORMATION:  4000 International units vitamin D  1 multivitamin  1 calcium 2X per day    NUTRITION HISTORY:  Recall Diet Questions Reviewed With Patient 9/12/2018   Describe what you typically consume for breakfast (typical or most recent): 4 eggs 1 bagel with margarine or wrap with scrambled eggs, or cereal 2 packs gluten free oatmeal   Describe what you typically consume for lunch (typical or most recent): skip because not hungry   Describe what you typically consume for supper (typical or most recent): Tuna on rice cakes or 1/2 medium pizza with 2 bread sticks or pb & J sandwich  or cereal with almond of pistachio milk    Describe what you typically consume as snacks (typical or most recent): Cereal with almond milk or small " snicker or ice cream or banana, 1 cup coffee with almond milk, 2 liter Sprite or Diet Mountain Dew or 8-16 oz fruit juice/day, occasional sweet tea   How many ounces of water, or other low calorie drinks, do you drink daily (8 oz=1 glass)? 48 oz   How many ounces of caffeine (coffee, tea, pop) do you drink daily (8 oz=1 glass)? 16 oz   How many ounces of carbonated (pop, beer, sparkling water) drinks do you drink daily (8 oz=1 glass)? 16 oz   How many ounces of juice, pop, sweet tea, sports drinks, protein drinks, other sweetened drinks, do you drink daily (8 oz=1 glass)? 32 oz   How often do you drink alcohol? Every 2-3 months   If you do drink alcohol, how many drinks might you have in a day? (one drink = 5 oz. wine, 1 can/bottle of beer, 1 shot liquor) 1 or 2       Eating Habits 9/12/2018   Do you have any dietary restrictions? Yes   Do you currently binge eat (eat a large amount of food in a short time)? No, after discussing this with patient   Are you an emotional eater? Yes   What foods do you crave? ice cream lately, or snicker, vanilla wafers, lenin crackers       ADDITIONAL INFORMATION:  Patient lives with her two kittens in an apartment. She shared that she was living out of her car last year. Patient tires to batch cook and freeze foods. Car is broken so getting to a grocery store is a challenge. Has access to food stamps.  can bring patient to the food shelf or bring food 1X every other week. Patient does not tolerate tomato products, broccoli and gluten (says can't afford to eat gluten free). Patient states gluten causes joints to be more painful. Feel binge eating is more over eating when certain food are made available. Patient says she will binge eat cookies for a meal  < 1X per month is someone gives her cookies and she has no other food in the house. Weight gain attributed to being on steroids since 2016.    Dining Out History Reviewed With Patient 9/12/2018   How often do you dine  out? Rarely.   Where do you dine out? (select all that apply) other   What types of food do you order when you dine out? pizza       Physical Activity Reviewed With Patient 9/12/2018   How often do you exercise? Never   What keeps you from being more active?  Pain, My ability to walk or move around is limited, Shortness of breath       NUTRITION DIAGNOSIS:  Obesity r/t long history of self-monitoring deficit and excessive energy intake aeb BMI >30 kg/m2.    INTERVENTION:  Intervention Provided/Education Provided on post-op diet guidelines, vitamins/minerals essential post-operatively, GI anatomy of bariatric surgeries, ways to help prepare for post-op diet guidelines pre-operatively, portion/calorie-control, mindful eating and Eating well on a Budget .  Provided pt with list of goals RD contact information.      Questions Reviewed With Patient 9/12/2018   How ready are you to make changes regarding your weight? Number 1 = Not ready at all to make changes up to 10 = very ready. 10   How confident are you that you can change? 1 = Not confident that you will be successful making changes up to 10 = very confident. 10       Patient Understanding: fair-good  Expected Compliance: fair    GOALS:  Start 8932-5892 mg calcium with vitamin D (2-3 separate doses)  Eat lunch daily  Practice alternatives to emotion eating    Follow-Up:   Recommend 5 follow up visits to assist with lifestyle changes or per insurance.      Time spent with patient: 55 minutes.    Harjinder Carvalho RD, LD  [facility]  [phone]

## 2018-09-17 NOTE — MR AVS SNAPSHOT
MRN:[id number]                      After Visit Summary   9/17/2018    Amelia Lin    MRN: [id number]           Visit Information        Provider Department      9/17/2018 9:30 AM 1, Sh Wl Diet, RD [facility]      Your next 10 appointments already scheduled     Oct 22, 2018  8:30 AM CDT   Return Bariatric Visit with Tonie Panchal PA-C   [facility]    [facility] [address] [id number] [phone]            Oct 22, 2018  9:30 AM CDT   Return Bariatric Nutrition Visit with Bárbara Wl Diet 1, RD   [facility])    [facility] [address] [id number] [phone]              MyChart Information     [employer] gives you secure access to your electronic health record. If you see a primary care provider, you can also send messages to your care team and make appointments. If you have questions, please call your primary care clinic.  If you do not have a primary care provider, please call [phone] and they will assist you.        Care EveryWhere ID     This is your Care EveryWhere ID. This could be used by other organizations to access your [facility] medical records  [id number]        Equal Access to Services     VICKIE HERNANDEZ : Hadii denisa smitho Sogabriel, waaxda luqadaha, qaybta kaalmada jacqui, flora reeves. So [facility] [phone].    ATENCIÓN: Si habla español, tiene a dumont disposición servicios gratuitos de asistencia lingüística. Llame al [phone].    We comply with applicable federal civil rights laws and [place] laws. We do not discriminate on the basis of race, color, national origin, age, disability, sex, sexual orientation, or gender identity.

## 2018-09-17 NOTE — PROGRESS NOTES
"2018      New Bariatric Surgery Consultation Note    RE: Amelia Lin  MR#: [id number]  : 1970      Referring provider:       2018   Who referred you? Dr Favian Tang       Chief Complaint/Reason for visit: evaluation for possible weight loss surgery    Dear Favian Tang MD (General),    I had the pleasure of seeing your patient, Amelia Lin, to evaluate her obesity and consider her for possible weight loss surgery. As you know, Amelia Lin is 48 year old.  She has a height of 5' 6\", a weight of 323 lbs 0 oz, and calculated Body mass index is 52.13 kg/(m^2).    HISTORY OF PRESENT ILLNESS:  Weight Loss History Reviewed with Patient 2018   How long have you been overweight? Since early childhood   What is the most that you have ever weighed? 330   What is the most weight you have lost? 30   I have tried the following methods to lose weight Watching portions or calories, Exercise, Weight Watchers, Pre packaged meals ex: Nutrisystem, OTC Medications   I have tried the following weight loss medications? (Check all that apply) None   Have you ever had weight loss surgery? No   Gained the majority of her weight when she started on prednisone when she was fist diagnosed with her adrenal insufficiency.      CO-MORBIDITIES OF OBESITY INCLUDE:     2018   I have the following co-morbidities associated with obesity: Pre-Diabetes, Dyspnea on exertion,  High Blood Pressure, Lower Extremity Edema, GERD (Reflux), Asthma, Weight Bearing Joint Pain   Are you taking daily medication for heartburn, acid reflux, or GERD (acid reflux disease)? Taking it prn.  Having reflux almost daily for last month.       PAST MEDICAL HISTORY:  Past Medical History:   Diagnosis Date     Adrenal insufficiency (H) (Sees Endocrinology in [place])      Anxiety      Arthritis     in back and knees     DJD          Depressive disorder      Esophageal reflux      Head injury- concussion      History of colonic " polyps     removed in  2012 or 2013     IBS (irritable bowel syndrome)      Migraines      Tachycardia     Syncope, workup currently with [facility]      Photophobia of both eyes      Pneumonia     fall or winter have not had in 3 years     Skin disease     eczema on elbows     Ulcer, gastric, acute     had ulcer age 14      Uncomplicated asthma     seasonal allergy       PAST SURGICAL HISTORY: No previous bleeding or anesthesia concerns with surgery. Propanolol - causes PONV can be avoided with Decadron and Zofran.  Past Surgical History:   Procedure Laterality Date     GYN SURGERY      hysterectomy     GYN SURGERY      c section     ORTHOPEDIC SURGERY Bilateral 2016     RELEASE CARPAL TUNNEL Right 2000     TOE SURGERY  2018     TUBAL LIGATION       FAMILY HISTORY:   Family History   Problem Relation Age of Onset     Rheumatoid Arthritis Mother      Diabetes Father      Obesity Father      Sarcoidosis Sister      Lung Cancer Brother      Alcoholism Brother      Diabetes Maternal Grandmother        SOCIAL HISTORY:   Social History Questions Reviewed With Patient 9/12/2018   Which best describes your employment status (select all that apply) Unemployed, 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 Denied disability.    Which best describes your marital status: single   Do you have children? Yes, Has 2 grown children.   Who do you have in your support network that can be available to help you for the first 2 weeks after surgery? mom   Who can you count on for support throughout your weight loss surgery journey?  , through [employer], does not have a car because it broke down.  Does have stable housing.  Currently has med cab to get her to doctor appointments.    Can you afford 3 meals a day?  Use food shelf and food stamps.     Can you afford 50-60 dollars a month for vitamins? No, but can be prescribed and picked up free       HABITS:     9/12/2018    How often do you drink alcohol? Monthly or less   If you do drink alcohol, how many drinks might you have in a day? (one drink = 5 oz. wine, 1 can/bottle of beer, 1 shot liquor) 1 or 2   Do you currently use any of the following Nicotine products? No   Have you ever used any of the following nicotine products? No   Have you or are you currently using street drugs or prescription strength medication for which you do not have a prescription for? No   Do you have a history of chemical dependency (alcohol or drug abuse)? No       PSYCHOLOGICAL HISTORY:   Psychological History Reviewed With Patient 9/12/2018   Have you ever attempted suicide? Last attempt was in August 2016.  Attempted twice with pills.  Didn't work.Took combo of 80 hydrochlorothiazide and tizidaine and fell asleep.  Woke up in am without any consequences.  Decided no reason to re-try.    Have you had thoughts of suicide in the past year? Yes,  Due to severe depression in 2015 secondary to  financial stress after unable to work due to work comp injury.    Have you ever been hospitalized for mental illness or a suicide attempt? In the last 1 to 5 years. See above.  No current ideation or plan.   Do you have a history of chronic pain? Yes   Have you ever been diagnosed with fibromyalgia? Yes   Are you currently being treated for any of the following? (select all that apply) Depression, Anxiety   Are you currently seeing a therapist or counselor?  No, needs one but hasn't found one in last year. Had one but ended professional relationship after patient-doctor relationship was compromised through facebook.      Are you currently seeing a psychiatrist? No       ROS:     9/12/2018   Skin:  Leg swelling   HEENT: Headaches, Dizziness/lightheadedness, Missing teeth, Teeth, dentures, or bridges needing repairs   If you answered yes to missing teeth, please indicate how many: 1   Musculoskeletal: Joint Pain, Back pain, Limited mobility, Arthritis    Cardiovascular: Shortness of breath with activity-Currently undergoing cardiac evaluation with [facility],. Had recent ECHO.   Pulmonary: Shortness of breath with activity   Gastrointestinal: GERD-Awakens at night from sleep with vomit for the last year.  Has had several times she has aspirated.     IBS with constipation-  Uses Creon with every meals.  Takes Zofran for nausea and needs to take it twice-three times a week. Using PPI prn.    Genitourinary: Stress incontinence    Hematological: None of the above   Neurological: Migraine headaches   Female only: None of the above       EATING BEHAVIORS:     9/12/2018   Have you or anyone else thought that you had an eating disorder? Yes   If you answered yes to the previous eating disorder question, select the types that apply from this list: Binge Eating- pt does no have true binges   Do you currently binge eat (eat a large amount of food in a short time)? No   Are you an emotional eater? Yes       EXERCISE:     9/12/2018   How often do you exercise? Never   What keeps you from being more active?  Pain, My ability to walk or move around is limited, Shortness of breath       MEDICATIONS:  Current Outpatient Prescriptions   Medication     amylase-lipase-protease (CREON 24) 13558-44442 units CPEP per EC capsule     Artificial Tear Ointment (ARTIFICIAL TEARS) OINT     calcium carbonate 600 mg-vitamin D 400 units (CALCIUM 600 + D) 600-400 MG-UNIT per tablet     cholecalciferol 2000 units CAPS     FLUoxetine HCl (PROZAC PO)     hydrocortisone (CORTEF) 10 MG tablet     hypromellose (GENTEAL MILD TO MODERATE) 0.3 % SOLN ophthalmic solution     metoprolol tartrate (LOPRESSOR) 50 MG tablet     Multiple Vitamins-Iron (QC DAILY MULTIVITAMINS/IRON) TABS     NAPROXEN SODIUM PO     omeprazole (PRILOSEC) 40 MG capsule     ondansetron (ZOFRAN-ODT) 8 MG ODT tab     polyethylene glycol (MIRALAX/GLYCOLAX) powder     DiphenhydrAMINE HCl (BENADRYL PO)     No current  "facility-administered medications for this visit.        ALLERGIES:  Allergies   Allergen Reactions     Codeine GI Disturbance     Morphine Nausea and Vomiting     Oxycodone-Acetaminophen Other (See Comments)     abdominal pain     Toradol [Ketorolac] Hives     Tramadol Itching     Fentanyl Nausea     Lyrica [Pregabalin] Rash     Pantoprazole Rash     Penicillins Rash     Valium [Diazepam] Rash       LABS/IMAGING/MEDICAL RECORDS REVIEW:   ECHO [facility] 9/11/18 EF 60-65% NSR    PHYSICAL EXAM:  /79 (BP Location: Right arm, Patient Position: Sitting, Cuff Size: Adult Large)  Pulse 79  Resp 12  Ht 5' 6\" (1.676 m)  Wt 323 lb (146.5 kg)  SpO2 99%  BMI 52.13 kg/m2  GENERAL:  Good development and normal affect in no acute distress. Patient is alert and orientated x 3 and answers all questions appropriately.  HEENT: Head is normocephalic, nontraumatic. Pupils equal and round without conjunctival injection. Neck is supple without lymphadenopathy, thyroidmegaly, or mass. Trachea midline. Dentition shows 1 missing tooth.   CARDIOVASCULAR:  Regular rate and rhythm without murmurs, rubs, or gallops.  RESPIRATORY: Lungs are clear to auscultation bilaterally with good breath sounds.  GASTROINTESTINAL: Abdomen is obese, nondistended, soft, nontender, without organomegaly or masses. No bruits.  LOWER EXTREMITIES: 2+ pitting LE edema bilaterally, no cyanosis, ulceration, or chronic venous stasis noted.  MUSCULOSKELETAL:  Moves all 4 extremities equal and strong. Has antalgic gait.   NEUROLOGIC: Cranial nerves II-XII grossly intact.  SKIN: No intertriginous irritation or rash.       In summary, Amelia Lin has Class III obesity with a body mass index of Body mass index is 52.13 kg/(m^2). kg/m2 and the comorbidities stated above. Today I reviewed her extensive medical record.       I ordered omeprazole 40 mg to her pharmacy today.  She will start taking this twice daily. We also discussed no eating for at least 1 hour prior " to bedtime.  She will prop her head up with 3 pillows to a 30% angle when sleeping in bed.  In addition, a gastroenterology referral was ordered today.      She will see me in follow up next month to formulate a task list and review the process to surgery.  Additional follow up will include bariatric education regarding the gastric bypass.   All questions were answered.  A goal sheet and support group handout were given to the patient.  Patient contract was signed.       Sincerely,    Tonie Panchal PA-C    I spent a total of 60 minutes face to face with Amelia during today's office visit. Over 50% of this time was spent counseling the patient and/or coordinating care.

## 2018-09-17 NOTE — LETTER
Amelia Delia  September 17, 2018        Bariatric Task List      Required Weight loss:    Lose *** lbs prior to surgery.  Goal Weight: *** lbs  Tasks:  Have preoperative laboratory tests drawn.   Psychological Evaluation with MMPI and clearance for weight loss surgery.  Achieve clearance from dietitian to see surgeon.  A total of 6 structured dietitian weight loss visits are required by your insurance.    GI Clearance    Cardiac Clearance    Endocrinology Clearance

## 2018-09-17 NOTE — MR AVS SNAPSHOT
After Visit Summary   9/17/2018    Amelia Lin    MRN: [id number]           Patient Information     Date Of Birth          1970        Visit Information        Provider Department      9/17/2018 8:30 AM Tonie Panchal PA-C [facility]      Today's Diagnoses     Prediabetes    -  1    Morbid obesity (H)        Gastroesophageal reflux disease without esophagitis        Essential hypertension        Bilateral leg edema        Photophobia of both eyes        Screening for iron deficiency anemia        Screening for endocrine, nutritional, metabolic and immunity disorder        Bilious vomiting with nausea        Irritable bowel syndrome with constipation          Care Instructions    Have labs drawn  Start Omeprazole 40 mg twice daily.  Do not eat 1 hour before bed.  Sleep with 2-3 pillows propped up under head to elevated you about 30 degrees.   Call to schedule GI consult.  Make appointment to return to clinic in 1 month to see the dietician and Tonie Panchal PA-C.                  Follow-ups after your visit        Additional Services     GASTROENTEROLOGY ADULT REF CONSULT ONLY       Preferred Location: [facility] [phone]    Pt is having work up for bariatric surgery.  Has hx of IBS and GERD.  GERD symptoms are uncontrolled.  IS also having frequent nausea and vomiting.  Please evaluate and treat.  Must have this controlled and get GI clearance before bariatric surgery.     Please be aware that coverage of these services is subject to the terms and limitations of your health insurance plan.  Call member services at your health plan with any benefit or coverage questions.  Any procedures must be performed at a [facility] facility OR coordinated by your clinic's referral office.    Please bring the following with you to your appointment:    (1) Any X-Rays, CTs or MRIs which have been performed.  Contact the  facility where they were done to arrange for  prior to your scheduled appointment.    (2) List of current medications   (3) This referral request   (4) Any documents/labs given to you for this referral            NUTRITION REFERRAL       Type of nutrition instruction needed: Weight Loss  Your provider has referred you to:     [facility]                  Follow-up notes from your care team     Return in 1 month (on 10/17/2018).      Your next 10 appointments already scheduled     Oct 22, 2018  8:30 AM CDT   Return Bariatric Visit with Tonie Panchal PA-C   [facility] [place] ([facility])    [facility]  [address]  [facility] [address] [phone]            Oct 22, 2018  9:30 AM CDT   Return Bariatric Nutrition Visit with Bárbara Richard 1, RD   [facility] [place] ([facility])    [address]  [address]  [facility] [id number] [phone]              Future tests that were ordered for you today     Open Future Orders        Priority Expected Expires Ordered    CBC with platelets Routine 9/17/2018 3/16/2019 9/17/2018    Comprehensive metabolic panel Routine 9/17/2018 3/16/2019 9/17/2018    Hemoglobin A1c Routine 9/17/2018 3/16/2019 9/17/2018    Vitamin D Deficiency Routine 9/17/2018 3/16/2019 9/17/2018            Who to contact     If you have questions or need follow up information about today's clinic visit or your schedule please contact [facility] directly at [phone].  Normal or non-critical lab and imaging results will be communicated to you by MyChart, letter or phone within 4 business days after the clinic has received the results. If you do not hear from us within 7 days, please contact the clinic through MyChart or phone. If you have a critical or abnormal lab result, we will notify you by phone as  "soon as possible.  Submit refill requests through [employer] or call your pharmacy and they will forward the refill request to us. Please allow 3 business days for your refill to be completed.          Additional Information About Your Visit        [employer] Render Information     [employer] gives you secure access to your electronic health record. If you see a primary care provider, you can also send messages to your care team and make appointments. If you have questions, please call your primary care clinic.  If you do not have a primary care provider, please call [phone] and they will assist you.        Care EveryWhere ID     This is your Care EveryWhere ID. This could be used by other organizations to access your [place] medical records  [id number]        Your Vitals Were     Pulse Respirations Height Pulse Oximetry BMI (Body Mass Index)       79 12 5' 6\" (1.676 m) 99% 52.13 kg/m2        Blood Pressure from Last 3 Encounters:   09/17/18 123/79    Weight from Last 3 Encounters:   09/17/18 323 lb (146.5 kg)   05/26/15 264 lb (119.7 kg)              We Performed the Following     GASTROENTEROLOGY ADULT REF CONSULT ONLY     NUTRITION REFERRAL          Today's Medication Changes          These changes are accurate as of 9/17/18 10:49 AM.  If you have any questions, ask your nurse or doctor.               Start taking these medicines.        Dose/Directions    calcium carbonate 600 mg-vitamin D 400 units 600-400 MG-UNIT per tablet   Commonly known as:  calcium 600 + D   Used for:  Morbid obesity (H)   Started by:  Tonie Panchal PA-C        Dose:  1 tablet   Take 1 tablet by mouth 2 times daily Take one twice daily and at least 2 hours apart from iron   Quantity:  180 tablet   Refills:  3       QC DAILY MULTIVITAMINS/IRON Tabs   Used for:  Morbid obesity (H)   Started by:  Tonie Panchal PA-C        Dose:  2 tablet   Take 2 tablets by mouth daily   Quantity:  180 tablet   Refills:  3         These " medicines have changed or have updated prescriptions.        Dose/Directions    * OMEPRAZOLE PO   This may have changed:  Another medication with the same name was added. Make sure you understand how and when to take each.   Changed by:  Tonie Panchal PA-C        Dose:  20 mg   Take 20 mg by mouth 2 times daily (before meals)   Refills:  0       * omeprazole 40 MG capsule   Commonly known as:  priLOSEC   This may have changed:  You were already taking a medication with the same name, and this prescription was added. Make sure you understand how and when to take each.   Used for:  Morbid obesity (H), Gastroesophageal reflux disease without esophagitis   Changed by:  Tonie Panchal PA-C        Dose:  40 mg   Take 1 capsule (40 mg) by mouth 2 times daily   Quantity:  60 capsule   Refills:  2       * vitamin D3 2000 units Caps   This may have changed:  Another medication with the same name was added. Make sure you understand how and when to take each.   Changed by:  Tonie Panchal PA-C        Dose:  4000 Units   Take 4,000 Units by mouth   Refills:  0       * cholecalciferol 2000 units Caps   This may have changed:  You were already taking a medication with the same name, and this prescription was added. Make sure you understand how and when to take each.   Used for:  Morbid obesity (H)   Changed by:  Tonie Panchal PA-C        Dose:  1 capsule   Take 1 capsule by mouth daily   Quantity:  90 capsule   Refills:  3       * Notice:  This list has 4 medication(s) that are the same as other medications prescribed for you. Read the directions carefully, and ask your doctor or other care provider to review them with you.      Stop taking these medicines if you haven't already. Please contact your care team if you have questions.     CORICIDIN D PO   Stopped by:  Tonie Pnachal PA-C           EXCEDRIN PO   Stopped by:  Tonie Panchal PA-C           Fish Oil 1200  MG Cpdr   Stopped by:  Tonie Panchal PA-C           HYDROCHLOROTHIAZIDE PO   Stopped by:  Tonie Panchal PA-C           magnesium oxide 400 (240 Mg) MG tablet   Commonly known as:  MAG-OX   Stopped by:  Tonie Panchal PA-C           oxyCODONE-acetaminophen 5-325 MG per tablet   Commonly known as:  PERCOCET   Stopped by:  Tonie Panchal PA-C           prednisoLONE acetate 1 % ophthalmic susp   Commonly known as:  PRED FORTE   Stopped by:  Tonie Panchal PA-C           predniSONE 1 MG tablet   Commonly known as:  DELTASONE   Stopped by:  Tonie Panchal PA-C           predniSONE 20 MG tablet   Commonly known as:  DELTASONE   Stopped by:  Tonie Panchal PA-C           predniSONE 5 MG tablet   Commonly known as:  DELTASONE   Stopped by:  Tonie Panchal PA-C           TIZANIDINE HCL PO   Stopped by:  Tonie Panchal PA-C           ZANAFLEX PO   Stopped by:  Tonie Panchal PA-C                Where to get your medicines      These medications were sent to [facility]/pharmacy #[id number] - [address] AT CORNER OF [address] [place] [id number]     Phone:  [phone]     calcium carbonate 600 mg-vitamin D 400 units 600-400 MG-UNIT per tablet    cholecalciferol 2000 units Caps    omeprazole 40 MG capsule    QC DAILY MULTIVITAMINS/IRON Tabs                Primary Care Provider Office Phone # Fax #    Favian Tang [phone]       [facility] [address]  [facility] [address]        Equal Access to Services     [facility] : Hadii denisa gramajo Sogabriel, waaxda luqadaha, qaybta kaalmada adetracy, flora reeves. So [facility] [phone].    ATENCIÓN: Si habla español, tiene a dumont disposición servicios gratuitos de asistencia lingüística. Llame al [phone].    We comply with applicable federal civil rights laws  and [place] laws. We do not discriminate on the basis of race, color, national origin, age, disability, sex, sexual orientation, or gender identity.            Thank you!     Thank you for choosing [facility]  for your care. Our goal is always to provide you with excellent care. Hearing back from our patients is one way we can continue to improve our services. Please take a few minutes to complete the written survey that you may receive in the mail after your visit with us. Thank you!             Your Updated Medication List - Protect others around you: Learn how to safely use, store and throw away your medicines at www.disposemymeds.org.          This list is accurate as of 9/17/18 10:49 AM.  Always use your most recent med list.                   Brand Name Dispense Instructions for use Diagnosis    amylase-lipase-protease 10462-20744 units Cpep per EC capsule    CREON 24     Take 2 capsules by mouth 3 times daily (with meals)        ARTIFICIAL TEARS Oint      1 Application        BENADRYL PO      Take by mouth nightly as needed        calcium carbonate 600 mg-vitamin D 400 units 600-400 MG-UNIT per tablet    calcium 600 + D    180 tablet    Take 1 tablet by mouth 2 times daily Take one twice daily and at least 2 hours apart from iron    Morbid obesity (H)       GENTEAL MILD TO MODERATE 0.3 % Soln ophthalmic solution   Generic drug:  hypromellose           hydrocortisone 10 MG tablet    CORTEF     TAKE 1.5 TABLETS BY MOUTH IN THE MORNING, AND 0.5 TABLETS IN THE AFTERNOON        metoprolol tartrate 50 MG tablet    LOPRESSOR     TAKE 1 TABLET BY MOUTH TWICE A DAY        NAPROXEN SODIUM PO      Take 550 mg by mouth 2 times daily (with meals)        * OMEPRAZOLE PO      Take 20 mg by mouth 2 times daily (before meals)        * omeprazole 40 MG capsule    priLOSEC    60 capsule    Take 1 capsule (40 mg) by mouth 2 times daily    Morbid obesity (H), Gastroesophageal reflux disease  without esophagitis       ondansetron 8 MG ODT tab    ZOFRAN-ODT          polyethylene glycol powder    MIRALAX/GLYCOLAX     Take 17 g by mouth        PROZAC PO      Take 10 mg by mouth daily        QC DAILY MULTIVITAMINS/IRON Tabs     180 tablet    Take 2 tablets by mouth daily    Morbid obesity (H)       * vitamin D3 2000 units Caps      Take 4,000 Units by mouth        * cholecalciferol 2000 units Caps     90 capsule    Take 1 capsule by mouth daily    Morbid obesity (H)       * Notice:  This list has 4 medication(s) that are the same as other medications prescribed for you. Read the directions carefully, and ask your doctor or other care provider to review them with you.

## 2018-09-17 NOTE — PATIENT INSTRUCTIONS
Have labs drawn  Start Omeprazole 40 mg twice daily.  Do not eat 1 hour before bed.  Sleep with 2-3 pillows propped up under head to elevated you about 30 degrees.   Call to schedule GI consult.  Make appointment to return to clinic in 1 month to see the dietician and Tonie Panchal PA-C.

## 2018-09-18 LAB — DEPRECATED CALCIDIOL+CALCIFEROL SERPL-MC: 43 UG/L (ref 20–75)

## 2018-10-22 ENCOUNTER — OFFICE VISIT (OUTPATIENT)
Dept: SURGERY | Facility: CLINIC | Age: 48
End: 2018-10-22
Payer: COMMERCIAL

## 2018-10-22 VITALS
HEART RATE: 86 BPM | BODY MASS INDEX: 47.09 KG/M2 | DIASTOLIC BLOOD PRESSURE: 79 MMHG | SYSTOLIC BLOOD PRESSURE: 119 MMHG | OXYGEN SATURATION: 99 % | RESPIRATION RATE: 12 BRPM | HEIGHT: 66 IN | WEIGHT: 293 LBS

## 2018-10-22 VITALS — HEIGHT: 66 IN | WEIGHT: 293 LBS | BODY MASS INDEX: 47.09 KG/M2

## 2018-10-22 DIAGNOSIS — K21.9 GASTROESOPHAGEAL REFLUX DISEASE WITHOUT ESOPHAGITIS: ICD-10-CM

## 2018-10-22 DIAGNOSIS — E66.01 MORBID OBESITY (H): ICD-10-CM

## 2018-10-22 DIAGNOSIS — Z71.89 ENCOUNTER FOR PRE-BARIATRIC SURGERY COUNSELING AND EDUCATION: ICD-10-CM

## 2018-10-22 DIAGNOSIS — E66.01 MORBID OBESITY (H): Primary | ICD-10-CM

## 2018-10-22 PROCEDURE — 97803 MED NUTRITION INDIV SUBSEQ: CPT | Performed by: DIETITIAN, REGISTERED

## 2018-10-22 PROCEDURE — 99215 OFFICE O/P EST HI 40 MIN: CPT | Performed by: PHYSICIAN ASSISTANT

## 2018-10-22 NOTE — MR AVS SNAPSHOT
MRN:[id number]                      After Visit Summary   10/22/2018    Amelia Lin    MRN: [id number]           Visit Information        Provider Department      10/22/2018 9:30 AM 1, Bárbara Chen Diet, RD [facility]      Your next 10 appointments already scheduled     Nov 19, 2018  3:00 PM CST   Return Bariatric Nutrition Visit with Bárbara Chen Diet 2, RD   [facility] [place] ([facility])    [address] [place] [id number] [phone]            Nov 19, 2018  3:30 PM CST   Return Bariatric Visit with Tonie Panchal PA-C   [facility] [place] ([facility])    [address] [place] [id number] [phone]              MyChart Information     [employer] gives you secure access to your electronic health record. If you see a primary care provider, you can also send messages to your care team and make appointments. If you have questions, please call your primary care clinic.  If you do not have a primary care provider, please call [phone] and they will assist you.        Care EveryWhere ID     This is your Care EveryWhere ID. This could be used by other organizations to access your [facility] medical records  [id number]        Equal Access to Services     VICKIE HERNANDEZ : Hadii denisa feliz hadasho Soomaali, waaxda luqadaha, qaybta kaalmada adetracy, flora reeves. So [facility] [phone].    ATENCIÓN: Si habla español, tiene a dumont disposición servicios gratuitos de asistencia lingüística. Llame al [phone].    We comply with applicable federal civil rights laws and [place] laws. We do not discriminate on the basis of race, color, national origin, age, disability, sex, sexual orientation, or gender identity.

## 2018-10-22 NOTE — PROGRESS NOTES
"PRE SURGICAL WEIGHT LOSS NUTRITION APPOINTMENT    Amelia Lin  1970  female  [id number]  48 year old    ASSESSMENT    Desired Surgical Procedure: gastric bypass (as of today)    REASON FOR VISIT:  Amelia Lin is a 48 year old year old female presents today for a pre-surgical weight loss follow-up appointment. Patient accompanied by self.    DIAGNOSIS:  Weight Status Obesity Grade III BMI >40    ANTHROPOMETRICS:  Height: 167.6 cm (5' 6\")  Initial Weight:  146.5 kg (323 lb)    Current weight: (!) 150.9 kg (332 lb 9.6 oz)  BMI: 53.8 kg/(m^2).    VITAMINS AND MINERALS:  1 Multivitamin with Minerals  2-315 mg Calcium with Vitamin D BID  2000 International units Vitamin D      NUTRITION HISTORY:  Breakfast: 2 slice of toast with margarine, 3 or 4 eggs  Lunch: peanut butter and jelly sandwich  Supper: tomato soup with spinach added  Snacks: rare   Fluids Consumed: Water and Koolaide, hot or iced tea (herbal or decaf)  Eating slower: No  Chewing foods thoroughly: No  Take 20-30 minutes to consume each meal: Yes  Fluids and meals separate by at least 30 minutes: No  Carbonation: none  Caffeine: none  Additional Information: Patient shared that she had one day of emotional due to stress, but this was an improvement. Admits to eating some gluten containing foods because they are cheaper and more available at the food shelf. Steroid dose has been decreased per pt. And she thinks this will help with weight loss. Patient had to be redirected a few times during the visit, but was fine with this.  continue to help patient obtain food per feedback from patient.    PHYSICAL ACTIVITY:  Type: none    DIAGNOSIS:  Previous Nutrition Diagnosis: Obesity related to long history of self- monitoring deficit and excessive energy intake evidenced by BMI of 52.13 kg/m2  No change, modified below    Previous goals:   Start 1708-1441 mg calcium with vitamin D (2-3 separate doses)-met  Eat lunch daily-improving  Practice " alternatives to emotion eating-improving    Current Nutrition Diagnosis: Obesity related to long history of self-monitoring deficit and excessive energy intake as evidenced by BMI of 53.8 kg/m2.    INTERVENTION:  Nutrition Prescription: Recommended energy/nutrient modification.    GOALS:  Walk 2X per week for 20 minutes  Drink 48-64 oz fluid per day (avoid sweetened beverages)  Practice avoiding fluids with meals      Intervention:  - Discussed progress towards previous goals.  - Reinforced importance of making behavior changes in preparation for bariatric surgery.   - Assessed learning needs and learning preferences       NUTRITION MONITORING AND EVALUATION:  Anticipated compliance: fair  Patient demonstrated fair-good understanding.       Follow up: Continue to monitor patient closely regarding weight loss and diet.  # of visits needed: 4  Cleared by RD: No     TIME SPENT WITH PATIENT: 25 minutes    Harjinder Carvalho RD, LD  [facility]  [phone]

## 2018-10-22 NOTE — PROGRESS NOTES
October 25, 2018    Bariatric Education Visit    CC: Obesity    HPI: I had the pleasure of seeing Amelia in the office today to continue her initial evaluation for bariatric surgery. I saw the patient last month to evaluate her obesity and consider her for possible weight loss surgery.  She has an extensive history.  Today we will continue our evaluation.      Last month I ordered omeprazole 40 mg BID to her pharmacy because she was still getting significant reflux.  This has improved symptoms.  Is getting symptoms 1-2 times a week on medication.  Still threw up twice from sleep.      Is not eating for at least 2 hour prior to bedtime.  She will prop her head up with 3 pillows to a 30% angle when sleeping in bed. Has gastroenterology consult ordered last visit on Oct 24th.       SOCIAL HX:  Pt's car is still unusable.  She has started a go [employer] page for her car repair.        ROS:  PAIN: Followed by Dr. Orozco at the [facility]. She has been going to physical therapy at [facility].  Has referral for pool therapy from low back and knees.   ENDOCRINE:  Followed up with Endocrinology at [facility]. They believed she was on too much steroid. (This probably contributed to her recent weight gain.)  They cut her back to 10 mg in the morning and 5 mg in the afternoon.  Then, after one month, if still feeling okay, shecandecrease to 10 mg daily.   NEURO: Sees Dr. Mayfield at the [facility].  Migraines still present.  Gets them about 4x a month.  Is on Zomisamide and Amitriptyline for headaches. Has hydromorphone for Migraines.   GI: HAS continued problems with constipation.  Had a hard stool recently and following had some bleeding into the toilet.  She did Miralax following and is currently taking it daily.  This has improved symptoms.     BARIATRIC METRICS:    Current Weight: (!) 332 lb 9.6 oz (150.9 kg)  Body mass index is 53.68 kg/(m^2).   Cumulative weight loss (lbs): -9.6      /79  "(BP Location: Left arm, Patient Position: Sitting, Cuff Size: Adult Large)  Pulse 86  Resp 12  Ht 5' 6\" (1.676 m)  Wt (!) 332 lb 9.6 oz (150.9 kg)  SpO2 99%  BMI 53.68 kg/m2     Had a hard stool.  Constipation had a lot of blood drip into the toilet.  She did Miralax.  Is currently taking it daily.      LABS/TESTS REVIEWED: Echo 9/19/18 EF 60-65% Mild diliated RV    PHYSICAL EXAM:  /79 (BP Location: Left arm, Patient Position: Sitting, Cuff Size: Adult Large)  Pulse 86  Resp 12  Ht 5' 6\" (1.676 m)  Wt (!) 332 lb 9.6 oz (150.9 kg)  SpO2 99%  BMI 53.68 kg/m2   GENERAL Pt in NAD.  HEART: No JVD  LUNGS: Breathing unlabored.  MUSC: Gait normal  NEURO: Alert and oriented x3.     ASSESSMENT:  Morbid Obesity    PLAN:    In summary, Amelia Lin has Class III obesity with a body mass index of Body mass index is 53.68 kg/(m^2). kg/m2 and the comorbidities stated above. She completed an informational seminar and is a candidate for the laparoscopic gastric bypass.      Today in the office we formulated a task list. She will have to complete the following pre-requisites:    Required Weight loss:    Maintain weight prior to surgery.  Goal Weight: 332.6 lbs  Tasks:  Have preoperative laboratory tests drawn.   Psychological Evaluation with MMPI and clearance for weight loss surgery.  Achieve clearance from dietitian to see surgeon.  A total of 6 structured dietitian weight loss visits are required by your insurance.    Endocrinology Clearance   Plan in place for surgery regard stress steroid dosing necessary before, during, and immediately post op    [facility] Clear[facility]   Find alternate form of pain relief besides NSAIDS (Naproxen).  Unable to use following surgery due to risk of ulcer formation.    GI Referral for GERD and constipation     Neurology Clearance   (Get migraines controlled, Find alternate form of pain relief besides NSAIDS (Naproxen) to abort migraines)    Get letter of support from " .    Get established with therapist and psychologist.  (gave contact information for [facility] where she is having her psych eval completed)    Follow up with primary care provider about anemia.    Once the patient has completed the requirements set forth in paragraph one, and there are no further recommendations, she will be allowed to see the surgeon of her choice for consultation on the gastric bypass surgery. She will see me in follow up next month to review bariatric education.  Patient verbalizes understanding of the process to surgery and the post operative schedule.  All questions were answered.      Sincerely,      Tonie Panchal PA-C    I spent a total of 50 minutes face to face with Amelia during today's office visit. Over 50% of this time was spent counseling the patient and/or coordinating care.

## 2018-10-22 NOTE — LETTER
Amelia Delia  October 22, 2018    Bariatric Task List      Required Weight loss:    Maintain weight prior to surgery.  Goal Weight: 332.6 lbs  Tasks:  Have preoperative laboratory tests drawn.   Psychological Evaluation with MMPI and clearance for weight loss surgery.  Achieve clearance from dietitian to see surgeon.  A total of 6 structured dietitian weight loss visits are required by your insurance.    Endocrinology Clearance   Plan in place for surgery regard stress steroid dosing necessary before, during, and immediately post op    Pain Clearance at [facility]   Find alternate form of pain relief besides NSAIDS (Naproxen).  Unable to use following surgery due to risk of ulcer formation.    GI Referral for GERD and constipation     Neurology Clearance   (Get migraines controlled, Find alternate form of pain relief besides NSAIDS (Naproxen) to abort migraines)    Get letter of support from .    Get established with therapist and psychologist.      Follow up with primary care provider about anemia.

## 2018-10-22 NOTE — MR AVS SNAPSHOT
After Visit Summary   10/22/2018    Amelia Lin    MRN: [id number]           Patient Information     Date Of Birth          1970        Visit Information        Provider Department      10/22/2018 8:30 AM Tonie Panchal PA-C [facility]      Today's Diagnoses     Morbid obesity (H)    -  1    Gastroesophageal reflux disease without esophagitis        Encounter for pre-bariatric surgery counseling and education           Follow-ups after your visit        Follow-up notes from your care team     Return in about 1 month (around 11/22/2018).      Your next 10 appointments already scheduled     Nov 19, 2018  3:00 PM CST   Return Bariatric Nutrition Visit with  April Diet 2, RD   [facility] ([facility])    [address] [place] [address] [id number] [phone]            Nov 19, 2018  3:30 PM CST   Return Bariatric Visit with Tonie Panchal PA-C   [facility])    [address] [place] [address] [id number] [phone]              Who to contact     If you have questions or need follow up information about today's clinic visit or your schedule please contact [facility] directly at [phone].  Normal or non-critical lab and imaging results will be communicated to you by MyChart, letter or phone within 4 business days after the clinic has received the results. If you do not hear from us within 7 days, please contact the clinic through MyChart or phone. If you have a critical or abnormal lab result, we will notify you by phone as soon as possible.  Submit refill requests through [employer] or call your pharmacy and they will forward the refill request to us. Please allow 3 business days for your refill to be  "completed.          Additional Information About Your Visit        [employer] [facility] Information     [employer] gives you secure access to your electronic health record. If you see a primary care provider, you can also send messages to your care team and make appointments. If you have questions, please call your primary care clinic.  If you do not have a primary care provider, please call [phone] and they will assist you.        Care EveryWhere ID     This is your Care EveryWhere ID. This could be used by other organizations to access your [facility] medical records  [id number]        Your Vitals Were     Pulse Respirations Height Pulse Oximetry BMI (Body Mass Index)       86 12 5' 6\" (1.676 m) 99% 53.68 kg/m2        Blood Pressure from Last 3 Encounters:   10/22/18 119/79   09/17/18 123/79    Weight from Last 3 Encounters:   10/22/18 (!) 332 lb 9.6 oz (150.9 kg)   10/22/18 (!) 332 lb 9.6 oz (150.9 kg)   09/17/18 323 lb (146.5 kg)              Today, you had the following     No orders found for display      Information about OPIOIDS     PRESCRIPTION OPIOIDS: WHAT YOU NEED TO KNOW   We gave you an opioid (narcotic) pain medicine. It is important to manage your pain, but opioids are not always the best choice. You should first try all the other options your care team gave you. Take this medicine for as short a time (and as few doses) as possible.    Some activities can increase your pain, such as bandage changes or therapy sessions. It may help to take your pain medicine 30 to 60 minutes before these activities. Reduce your stress by getting enough sleep, working on hobbies you enjoy and practicing relaxation or meditation. Talk to your care team about ways to manage your pain beyond prescription opioids.    These medicines have risks:    DO NOT drive when on new or higher doses of pain medicine. These medicines can affect your alertness and reaction times, and you could be arrested for driving under the influence (DUI). If " you need to use opioids long-term, talk to your care team about driving.    DO NOT operate heavy machinery    DO NOT do any other dangerous activities while taking these medicines.    DO NOT drink any alcohol while taking these medicines.     If the opioid prescribed includes acetaminophen, DO NOT take with any other medicines that contain acetaminophen. Read all labels carefully. Look for the word  acetaminophen  or  Tylenol.  Ask your pharmacist if you have questions or are unsure.    You can get addicted to pain medicines, especially if you have a history of addiction (chemical, alcohol or substance dependence). Talk to your care team about ways to reduce this risk.    All opioids tend to cause constipation. Drink plenty of water and eat foods that have a lot of fiber, such as fruits, vegetables, prune juice, apple juice and high-fiber cereal. Take a laxative (Miralax, milk of magnesia, Colace, Senna) if you don t move your bowels at least every other day. Other side effects include upset stomach, sleepiness, dizziness, throwing up, tolerance (needing more of the medicine to have the same effect), physical dependence and slowed breathing.    Store your pills in a secure place, locked if possible. We will not replace any lost or stolen medicine. If you don t finish your medicine, please throw away (dispose) as directed by your pharmacist. The [place] Pollution Control Agency has more information about safe disposal: [email or web address]/living-[email or web address]-unwanted-medications         Primary Care Provider Office Phone # Fax #    Favian Tang [phone]       [facility] [address] [facility] [facility] [address]        Equal Access to Services     VICKIE HERNANDEZ : Hadstephen gramajo Sogabriel, waaxda luqadaha, qaybta kaalflora pastor. So [facility] [place] [phone].    ATENCIÓN: Si habla español, tiene a dumont disposición servicios gratuitos  de asistencia lingüística. Kristyn feng [phone].    We comply with applicable federal civil rights laws and [place] laws. We do not discriminate on the basis of race, color, national origin, age, disability, sex, sexual orientation, or gender identity.            Thank you!     Thank you for choosing [facility]  for your care. Our goal is always to provide you with excellent care. Hearing back from our patients is one way we can continue to improve our services. Please take a few minutes to complete the written survey that you may receive in the mail after your visit with us. Thank you!             Your Updated Medication List - Protect others around you: Learn how to safely use, store and throw away your medicines at www.disposemymeds.org.          This list is accurate as of 10/22/18 11:59 PM.  Always use your most recent med list.                   Brand Name Dispense Instructions for use Diagnosis    amylase-lipase-protease 58833-93672 units Cpep per EC capsule    CREON 24     Take 2 capsules by mouth 3 times daily (with meals)        ARTIFICIAL TEARS Oint      1 Application        BENADRYL PO      Take by mouth nightly as needed        calcium carbonate 600 mg-vitamin D 400 units 600-400 MG-UNIT per tablet    calcium 600 + D    180 tablet    Take 1 tablet by mouth 2 times daily Take one twice daily and at least 2 hours apart from iron    Morbid obesity (H)       cholecalciferol 2000 units Caps     180 capsule    Take 2 capsules by mouth daily    Morbid obesity (H)       ELAVIL PO      Take 150 mg by mouth At Bedtime        GENTEAL MILD TO MODERATE 0.3 % Soln ophthalmic solution   Generic drug:  hypromellose           hydrocortisone 10 MG tablet    CORTEF     TAKE 1.5 TABLETS BY MOUTH IN THE MORNING, AND 0.5 TABLETS IN THE AFTERNOON        HYDROMORPHONE HCL PO      Take 2 mg by mouth every 6 hours as needed for moderate to severe pain (migraine)        metoprolol tartrate 50  MG tablet    LOPRESSOR     TAKE 1 TABLET BY MOUTH TWICE A DAY        NAPROXEN SODIUM PO      Take 550 mg by mouth 2 times daily (with meals)        omeprazole 40 MG capsule    priLOSEC    60 capsule    Take 1 capsule (40 mg) by mouth 2 times daily    Morbid obesity (H), Gastroesophageal reflux disease without esophagitis       ondansetron 8 MG ODT tab    ZOFRAN-ODT          polyethylene glycol powder    MIRALAX/GLYCOLAX     Take 17 g by mouth        PROZAC PO      Take 10 mg by mouth daily        QC DAILY MULTIVITAMINS/IRON Tabs     180 tablet    Take 2 tablets by mouth daily    Morbid obesity (H)       ZONEGRAN PO      Take 25 mg by mouth daily

## 2018-11-12 ENCOUNTER — HOSPITAL ENCOUNTER (OUTPATIENT)
Facility: CLINIC | Age: 48
End: 2018-11-12
Attending: INTERNAL MEDICINE | Admitting: INTERNAL MEDICINE
Payer: COMMERCIAL

## 2019-01-24 ENCOUNTER — DOCUMENTATION ONLY (OUTPATIENT)
Dept: SURGERY | Facility: CLINIC | Age: 49
End: 2019-01-24
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
## 2019-04-22 DIAGNOSIS — E66.01 MORBID OBESITY (H): ICD-10-CM

## 2019-04-22 DIAGNOSIS — K21.9 GASTROESOPHAGEAL REFLUX DISEASE WITHOUT ESOPHAGITIS: ICD-10-CM

## 2019-04-22 RX ORDER — OMEPRAZOLE 40 MG/1
40 CAPSULE, DELAYED RELEASE ORAL 2 TIMES DAILY
Qty: 60 CAPSULE | Refills: 2 | OUTPATIENT
Start: 2019-04-22

## 2019-04-22 NOTE — TELEPHONE ENCOUNTER
Refill request from patient's pharmacy for Omeprazole 40 mg qty 60. Last filled 01/28/2019    Blossom Munoz CMA on 4/22/2019 at 8:55 AM

## 2019-06-13 ENCOUNTER — OFFICE VISIT (OUTPATIENT)
Dept: SURGERY | Facility: CLINIC | Age: 49
End: 2019-06-13
Payer: COMMERCIAL

## 2019-06-13 VITALS
SYSTOLIC BLOOD PRESSURE: 120 MMHG | BODY MASS INDEX: 47.09 KG/M2 | DIASTOLIC BLOOD PRESSURE: 60 MMHG | HEIGHT: 66 IN | WEIGHT: 293 LBS | HEART RATE: 101 BPM | OXYGEN SATURATION: 99 %

## 2019-06-13 DIAGNOSIS — E66.01 MORBID OBESITY (H): Primary | ICD-10-CM

## 2019-06-13 DIAGNOSIS — F32.A DEPRESSIVE DISORDER: ICD-10-CM

## 2019-06-13 DIAGNOSIS — Z71.89 ENCOUNTER FOR PRE-BARIATRIC SURGERY COUNSELING AND EDUCATION: ICD-10-CM

## 2019-06-13 PROCEDURE — 99213 OFFICE O/P EST LOW 20 MIN: CPT | Performed by: PHYSICIAN ASSISTANT

## 2019-06-13 ASSESSMENT — MIFFLIN-ST. JEOR: SCORE: 2149.05
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
## 2019-06-17 ENCOUNTER — TELEPHONE (OUTPATIENT)
Dept: SURGERY | Facility: CLINIC | Age: 49
End: 2019-06-17

## 2019-06-17 NOTE — TELEPHONE ENCOUNTER
----- Message from Osmar Franklin sent at 6/5/2019 12:29 PM CDT -----  Regarding: FW: PT IS A [facility] BUT WANTS TO GET MORE INFO   Contact: [phone]      ----- Message -----  From: Hawa Victor  Sent: 6/4/2019   4:16 PM  To: Osmar Ayala  Subject: PT IS A [facility] BUT WANTS TO GET M#    PT WOULD LIKE INFORMATION ABOUT THE ORBERA GASTRIC BALLOON, SHE IS A [facility] PT. SEES JIE LOPEZ

## 2019-06-17 NOTE — TELEPHONE ENCOUNTER
Patient is not a candidate for the gastric balloon due to BMI 52.63, to qualify it needs to be 30-40.    Left message to call back if interested in other option of medical weight management.

## 2019-06-28 ENCOUNTER — TELEPHONE (OUTPATIENT)
Dept: SURGERY | Facility: CLINIC | Age: 49
End: 2019-06-28

## 2019-06-28 NOTE — TELEPHONE ENCOUNTER
Informed patient she was not a candidate for an intragastric balloon with our program, need BMI 30-40.  Discussed our medical weight management program.  Patient also wants to check out options at [facility].

## 2019-09-30 ENCOUNTER — HEALTH MAINTENANCE LETTER (OUTPATIENT)
Age: 49
End: 2019-09-30

## 2019-11-26 ENCOUNTER — TELEPHONE (OUTPATIENT)
Dept: OPHTHALMOLOGY | Facility: CLINIC | Age: 49
End: 2019-11-26

## 2019-11-26 NOTE — TELEPHONE ENCOUNTER
Left message with Jn from [employer].  They would like a letter saying it is medically necessary for patient to travel down to [facility].  Dr. Read would be unable to write letter as we have not seen patient since 2017 and cannot say that it was medically necessary back then.     Urszula Hung on 11/26/2019 at 4:07 PM

## 2020-03-15 ENCOUNTER — HEALTH MAINTENANCE LETTER (OUTPATIENT)
Age: 50
End: 2020-03-15

## 2020-06-15 ENCOUNTER — TRANSCRIBE ORDERS (OUTPATIENT)
Dept: OTHER | Age: 50
End: 2020-06-15

## 2020-06-15 DIAGNOSIS — H57.13 EYE PAIN, BILATERAL: Primary | ICD-10-CM

## 2020-06-16 ENCOUNTER — TELEPHONE (OUTPATIENT)
Dept: OPHTHALMOLOGY | Facility: CLINIC | Age: 50
End: 2020-06-16

## 2020-06-16 NOTE — TELEPHONE ENCOUNTER
[facility] Call Center    Phone Message    May a detailed message be left on voicemail: no  // unknown    Reason for Call: Appointment Intake    Referring Provider Name: Referred by [facility] to Dr. Read // PH: [phone] F: [phone]  Diagnosis and/or Symptoms: Bilateral Eye Pain    Diagnosis is not listed in the Neuro-Ophth scheduling protocols, pt last saw Dr. Read in January of 2017. Referral and records have been faxed to clinic. Please advise and contact patient for appropriate scheduling!    Action Taken: Message routed to:  Other: PWB: Eye Clinic    Travel Screening: Not Applicable

## 2020-06-24 NOTE — TELEPHONE ENCOUNTER
Left voicemail for patient to assist with getting scheduled.      Urszula Hung on 6/24/2020 at 2:33 PM

## 2021-01-15 ENCOUNTER — HEALTH MAINTENANCE LETTER (OUTPATIENT)
Age: 51
End: 2021-01-15

## 2021-03-25 ENCOUNTER — TRANSFERRED RECORDS (OUTPATIENT)
Dept: PHYSICAL THERAPY | Facility: CLINIC | Age: 51
End: 2021-03-25

## 2021-05-09 ENCOUNTER — HEALTH MAINTENANCE LETTER (OUTPATIENT)
Age: 51
End: 2021-05-09

## 2021-10-24 ENCOUNTER — HEALTH MAINTENANCE LETTER (OUTPATIENT)
Age: 51
End: 2021-10-24

## 2022-02-13 ENCOUNTER — HEALTH MAINTENANCE LETTER (OUTPATIENT)
Age: 52
End: 2022-02-13

## 2022-06-05 ENCOUNTER — HEALTH MAINTENANCE LETTER (OUTPATIENT)
Age: 52
End: 2022-06-05

## 2022-10-15 ENCOUNTER — HEALTH MAINTENANCE LETTER (OUTPATIENT)
Age: 52
End: 2022-10-15

## 2023-03-26 ENCOUNTER — HEALTH MAINTENANCE LETTER (OUTPATIENT)
Age: 53
End: 2023-03-26

## 2023-06-11 ENCOUNTER — HEALTH MAINTENANCE LETTER (OUTPATIENT)
Age: 53
End: 2023-06-11